# Patient Record
Sex: MALE | Race: WHITE | NOT HISPANIC OR LATINO | Employment: FULL TIME | URBAN - METROPOLITAN AREA
[De-identification: names, ages, dates, MRNs, and addresses within clinical notes are randomized per-mention and may not be internally consistent; named-entity substitution may affect disease eponyms.]

---

## 2023-05-23 ENCOUNTER — OFFICE VISIT (OUTPATIENT)
Dept: FAMILY MEDICINE | Facility: CLINIC | Age: 38
End: 2023-05-23
Payer: COMMERCIAL

## 2023-05-23 VITALS
DIASTOLIC BLOOD PRESSURE: 88 MMHG | OXYGEN SATURATION: 99 % | HEART RATE: 72 BPM | TEMPERATURE: 97.7 F | WEIGHT: 196.6 LBS | HEIGHT: 73 IN | BODY MASS INDEX: 26.06 KG/M2 | SYSTOLIC BLOOD PRESSURE: 130 MMHG | RESPIRATION RATE: 18 BRPM

## 2023-05-23 DIAGNOSIS — J02.9 VIRAL PHARYNGITIS: Primary | ICD-10-CM

## 2023-05-23 DIAGNOSIS — H69.93 DYSFUNCTION OF BOTH EUSTACHIAN TUBES: ICD-10-CM

## 2023-05-23 DIAGNOSIS — J02.9 SORE THROAT: ICD-10-CM

## 2023-05-23 LAB
DEPRECATED S PYO AG THROAT QL EIA: NEGATIVE
GROUP A STREP BY PCR: NOT DETECTED

## 2023-05-23 PROCEDURE — 87651 STREP A DNA AMP PROBE: CPT | Performed by: NURSE PRACTITIONER

## 2023-05-23 PROCEDURE — 87635 SARS-COV-2 COVID-19 AMP PRB: CPT | Performed by: NURSE PRACTITIONER

## 2023-05-23 PROCEDURE — 99203 OFFICE O/P NEW LOW 30 MIN: CPT | Performed by: NURSE PRACTITIONER

## 2023-05-23 RX ORDER — FLUTICASONE PROPIONATE 50 MCG
1 SPRAY, SUSPENSION (ML) NASAL DAILY
Qty: 16 G | Refills: 0 | Status: SHIPPED | OUTPATIENT
Start: 2023-05-23

## 2023-05-23 ASSESSMENT — ENCOUNTER SYMPTOMS
GASTROINTESTINAL NEGATIVE: 1
MUSCULOSKELETAL NEGATIVE: 1
FEVER: 0
COUGH: 0
RHINORRHEA: 1
HEADACHES: 1
RESPIRATORY NEGATIVE: 1
SORE THROAT: 1
SINUS PRESSURE: 1

## 2023-05-23 NOTE — PATIENT INSTRUCTIONS
Your rapid strep test was negative. You have viral pharyngitis.  You may take Ibuprofen and/or Tylenol for pain/fevers.  Other supportive therapy to try: cepacol throat lozenges, salt water gargles, soft and cold foods.  Rest and stay hydrated.  If symptoms worsen or do not improve over the next week, follow-up with your PCP.

## 2023-05-23 NOTE — PROGRESS NOTES
"  Assessment & Plan     Viral pharyngitis  Symptoms consistent with a viral infection. Discussed OTC recommendations for symptom mgmt and when to follow-up. Patient declined viscous lidocaine.    Dysfunction of both eustachian tubes  Discussed OTC recommendations for symptom mgmt and when to follow-up.      - fluticasone (FLONASE) 50 MCG/ACT nasal spray; Spray 1 spray into both nostrils daily    Sore throat  Strep test negative. Will send off for culture. COVID testing done in clinic and sent off.     - Streptococcus A Rapid Screen w/Reflex to PCR - Clinic Collect  - Symptomatic COVID-19 Virus (Coronavirus) by PCR Nose  - Group A Streptococcus PCR Throat Swab    Ordering of each unique test  Prescription drug management         BMI:   Estimated body mass index is 26.06 kg/m  as calculated from the following:    Height as of this encounter: 1.85 m (6' 0.84\").    Weight as of this encounter: 89.2 kg (196 lb 9.6 oz).       Patient Instructions   Your rapid strep test was negative. You have viral pharyngitis.  1. You may take Ibuprofen and/or Tylenol for pain/fevers.  2. Other supportive therapy to try: cepacol throat lozenges, salt water gargles, soft and cold foods.  3. Rest and stay hydrated.  4. If symptoms worsen or do not improve over the next week, follow-up with your PCP.        Elma Henley DNP  [facility]    Jose Manuel Vuong is a 37 year old, presenting for the following health issues:  Throat Problem        5/23/2023     2:34 PM   Additional Questions   Roomed by Lacy Hightower MA   Accompanied by N/A         5/23/2023     2:34 PM   Patient Reported Additional Medications   Patient reports taking the following new medications Vitamin C, OTC [facility] sinus pressure and pain     History of Present Illness       Reason for visit:  Sore throat sinus pressure  Symptom onset:  1-3 days ago  Symptoms include:  Sore throat sinus pressure  Symptom intensity:  Moderate  Symptom " "progression:  Worsening    He eats 2-3 servings of fruits and vegetables daily.He consumes 1 sweetened beverage(s) daily.He exercises with enough effort to increase his heart rate 20 to 29 minutes per day.  He exercises with enough effort to increase his heart rate 3 or less days per week.   He is taking medications regularly.       Acute Illness  Acute illness concerns:   Onset/Duration: yesterday  Symptoms:  Fever: No  Chills/Sweats: No  Headache (location?): YES, slight  Sinus Pressure: YES  Conjunctivitis:  No  Ear Pain: no  Rhinorrhea: YES  Congestion: YES  Sore Throat: YES  Cough: no  Wheeze: No  Decreased Appetite: No  Nausea: No  Vomiting: No  Diarrhea: No  Dysuria/Freq.: No  Dysuria or Hematuria: No  Fatigue/Achiness: No  Sick/Strep Exposure: No  Therapies tried and outcome: OTC [facility] sinus pressure and Vitamin C    Additional provider notes: Patient presents in clinic for 1 day of sinus symptoms that are worsening. No known sick contacts recently. Eyes are red but not itchy or painful.     No Known Allergies    No current outpatient medications on file.     No current facility-administered medications for this visit.       History reviewed. No pertinent past medical history.         Review of Systems   Constitutional: Negative for fever.   HENT: Positive for congestion, rhinorrhea, sinus pressure and sore throat.    Respiratory: Negative.  Negative for cough.    Gastrointestinal: Negative.    Musculoskeletal: Negative.    Neurological: Positive for headaches.            Objective    /88 (BP Location: Right arm, Patient Position: Chair, Cuff Size: Adult Large)   Pulse 72   Temp 97.7  F (36.5  C) (Tympanic)   Resp 18   Ht 1.85 m (6' 0.84\")   Wt 89.2 kg (196 lb 9.6 oz)   SpO2 99%   BMI 26.06 kg/m    Body mass index is 26.06 kg/m .  Physical Exam  Vitals reviewed.   Constitutional:       General: He is not in acute distress.     Appearance: Normal appearance. He is not ill-appearing or " toxic-appearing.   HENT:      Head: Normocephalic and atraumatic.      Right Ear: Ear canal and external ear normal. There is no impacted cerumen. Tympanic membrane is bulging.      Left Ear: Ear canal and external ear normal. There is no impacted cerumen. Tympanic membrane is bulging.      Nose: Congestion present.      Mouth/Throat:      Mouth: Mucous membranes are moist.      Pharynx: Oropharynx is clear. Posterior oropharyngeal erythema (posterior) present.   Eyes:      General: Lids are normal.      Conjunctiva/sclera:      Right eye: Right conjunctiva is injected.      Left eye: Left conjunctiva is injected.   Cardiovascular:      Rate and Rhythm: Normal rate and regular rhythm.      Pulses: Normal pulses.      Heart sounds: Normal heart sounds.   Pulmonary:      Effort: Pulmonary effort is normal.      Breath sounds: Normal breath sounds.   Skin:     General: Skin is warm and dry.   Neurological:      Mental Status: He is alert and oriented to person, place, and time.   Psychiatric:         Behavior: Behavior normal.            Results for orders placed or performed in visit on 05/23/23 (from the past 24 hour(s))   Streptococcus A Rapid Screen w/Reflex to PCR - Clinic Collect    Specimen: Throat; Swab   Result Value Ref Range    Group A Strep antigen Negative Negative

## 2023-05-24 LAB — SARS-COV-2 RNA RESP QL NAA+PROBE: NEGATIVE

## 2023-06-01 ENCOUNTER — HEALTH MAINTENANCE LETTER (OUTPATIENT)
Age: 38
End: 2023-06-01

## 2024-06-02 ENCOUNTER — HEALTH MAINTENANCE LETTER (OUTPATIENT)
Age: 39
End: 2024-06-02

## 2025-02-10 ENCOUNTER — OFFICE VISIT (OUTPATIENT)
Dept: PULMONOLOGY | Facility: CLINIC | Age: 40
End: 2025-02-10
Payer: COMMERCIAL

## 2025-02-10 ENCOUNTER — ANCILLARY PROCEDURE (OUTPATIENT)
Dept: GENERAL RADIOLOGY | Facility: CLINIC | Age: 40
End: 2025-02-10
Payer: COMMERCIAL

## 2025-02-10 ENCOUNTER — PRE VISIT (OUTPATIENT)
Dept: PULMONOLOGY | Facility: CLINIC | Age: 40
End: 2025-02-10

## 2025-02-10 VITALS
WEIGHT: 224 LBS | DIASTOLIC BLOOD PRESSURE: 84 MMHG | BODY MASS INDEX: 30.34 KG/M2 | OXYGEN SATURATION: 96 % | HEIGHT: 72 IN | HEART RATE: 82 BPM | SYSTOLIC BLOOD PRESSURE: 142 MMHG

## 2025-02-10 DIAGNOSIS — R06.2 WHEEZING: ICD-10-CM

## 2025-02-10 DIAGNOSIS — R05.9 COUGH: Primary | ICD-10-CM

## 2025-02-10 DIAGNOSIS — J20.9 ACUTE BRONCHITIS, UNSPECIFIED ORGANISM: Primary | ICD-10-CM

## 2025-02-10 DIAGNOSIS — R07.0 THROAT PAIN: ICD-10-CM

## 2025-02-10 DIAGNOSIS — R05.9 COUGH: ICD-10-CM

## 2025-02-10 DIAGNOSIS — B34.9 VIRAL SYNDROME: ICD-10-CM

## 2025-02-10 LAB
C PNEUM DNA SPEC QL NAA+PROBE: NOT DETECTED
DEPRECATED S PYO AG THROAT QL EIA: NEGATIVE
FLUAV H1 2009 PAND RNA SPEC QL NAA+PROBE: NOT DETECTED
FLUAV H1 RNA SPEC QL NAA+PROBE: NOT DETECTED
FLUAV H3 RNA SPEC QL NAA+PROBE: NOT DETECTED
FLUAV RNA SPEC QL NAA+PROBE: NOT DETECTED
FLUBV RNA SPEC QL NAA+PROBE: NOT DETECTED
HADV DNA SPEC QL NAA+PROBE: NOT DETECTED
HCOV PNL SPEC NAA+PROBE: NOT DETECTED
HMPV RNA SPEC QL NAA+PROBE: NOT DETECTED
HPIV1 RNA SPEC QL NAA+PROBE: NOT DETECTED
HPIV2 RNA SPEC QL NAA+PROBE: NOT DETECTED
HPIV3 RNA SPEC QL NAA+PROBE: NOT DETECTED
HPIV4 RNA SPEC QL NAA+PROBE: NOT DETECTED
M PNEUMO DNA SPEC QL NAA+PROBE: NOT DETECTED
RSV RNA SPEC QL NAA+PROBE: NOT DETECTED
RSV RNA SPEC QL NAA+PROBE: NOT DETECTED
RV+EV RNA SPEC QL NAA+PROBE: DETECTED
S PYO DNA THROAT QL NAA+PROBE: NOT DETECTED

## 2025-02-10 PROCEDURE — 87633 RESP VIRUS 12-25 TARGETS: CPT

## 2025-02-10 PROCEDURE — 87651 STREP A DNA AMP PROBE: CPT

## 2025-02-10 PROCEDURE — 99213 OFFICE O/P EST LOW 20 MIN: CPT

## 2025-02-10 PROCEDURE — 71046 X-RAY EXAM CHEST 2 VIEWS: CPT | Mod: GC | Performed by: RADIOLOGY

## 2025-02-10 PROCEDURE — 99207 PR NO CHARGE LOS: CPT

## 2025-02-10 PROCEDURE — 94726 PLETHYSMOGRAPHY LUNG VOLUMES: CPT | Performed by: INTERNAL MEDICINE

## 2025-02-10 PROCEDURE — 94060 EVALUATION OF WHEEZING: CPT | Performed by: INTERNAL MEDICINE

## 2025-02-10 PROCEDURE — 94729 DIFFUSING CAPACITY: CPT | Performed by: INTERNAL MEDICINE

## 2025-02-10 PROCEDURE — 99204 OFFICE O/P NEW MOD 45 MIN: CPT | Mod: 25

## 2025-02-10 PROCEDURE — 87486 CHLMYD PNEUM DNA AMP PROBE: CPT

## 2025-02-10 RX ORDER — PREDNISONE 20 MG/1
40 TABLET ORAL DAILY
Qty: 10 TABLET | Refills: 0 | Status: SHIPPED | OUTPATIENT
Start: 2025-02-10

## 2025-02-10 RX ORDER — OMEPRAZOLE 40 MG/1
40 CAPSULE, DELAYED RELEASE ORAL 2 TIMES DAILY
COMMUNITY
Start: 2024-11-12

## 2025-02-10 RX ORDER — CETIRIZINE HYDROCHLORIDE 10 MG/1
10 TABLET ORAL DAILY
COMMUNITY

## 2025-02-10 RX ORDER — CITALOPRAM HYDROBROMIDE 40 MG/1
40 TABLET ORAL EVERY MORNING
COMMUNITY

## 2025-02-10 ASSESSMENT — PAIN SCALES - GENERAL: PAINLEVEL_OUTOF10: SEVERE PAIN (9)

## 2025-02-10 NOTE — PROGRESS NOTES
[facility] [address] [address]  Phone: [phone]  Fax: [phone]    Patient:  Yevgeniy Weinstein Jr., Date of birth 1985  Date of Visit:  02/10/2025  Referring Provider Referred Self      Assessment & Plan      Suspected viral URI  History of eosinophilic esophagitis  Mild airflow obstruction  Previous smoker    There are a handful of things going on.  He has noticed a wheeze in his lungs for several weeks now which actually prompted him to stop smoking.  And then about a week and a half ago he had symptoms following inhalation of ice melts, but the symptoms overall sounded more consistent with a viral illness.  On top of that, he has a history of eosinophilic esophagitis and there is airflow obstruction noted on spirometry.  Differential diagnosis for him would include a viral URI and bronchitis, eosinophilic bronchitis or asthma, inhalation injury, smoking-related lung disease.    I will check a viral respiratory panel, strep throat swab.  Given his airflow obstruction, I do wonder about an eosinophilic process in his lungs.  I also suspect that he may have some mild improvement with prednisone and so I gave him a short course of oral steroids.    I suspect he will recover from this illness that is cropped up over the last week and a half.  However I would like to follow up with him given his airflow obstruction noted on spirometry and make sure that that improves as well.  I set up a follow-up with him in 3 to 4 months with spirometry and a CBC with differential looking for eosinophils.    Medical Decision Making             Ayan Rasheed MD              Today's visit note:     Chief Complaint: Cough    HISTORY OF PRESENT ILLNESS:    Yevgeniy Weinstein Jr. is a 39 year old year old male who is being seen for cough, sore throat and shortness of breath.    He has a history of eosinophilic esophagitis, nasal surgery  for deviated septum.    He has a history of smoking marijuana and vaping.  He has noticed wheezing in his lungs for a handful of weeks now.  He actually quit smoking as a result of this a couple weeks ago.  Just about 1 and half weeks ago, he did have an episode where he was using ice melt at his home, and inhaled some of the fine particles of this.  Also right around this time, he had a cascade of worsening symptoms which include nasal congestion, green mucus coming out of his nose and expectorating, body aches, chills, dizziness.  He had to take Sudafed, Afrin and Benadryl to help get him some rest.  Most of the head congestion symptoms have slightly improved over time, however he is still blowing some green mucus out of his nose.  He also has a wheeze and a cough in the upper portion of his chest.    He has a history of sinus infections.  This was in the reasons that he had surgery for the deviated septum.  He continues to have occasional sinus infections after that though.  The abnormal part about this current illness is that he has some upper chest symptoms.  He is concerned about the possibility that inhaling ice melt may be a major contributor.    No sick contacts that he is aware of.         Past Medical and Surgical History:     Past Medical History:   Diagnosis Date    Childhood asthma     Depression     Deviated nasal septum     Environmental allergies     Other seizures (H)     found in 2022.     Past Surgical History:   Procedure Laterality Date    nasal surgery      thumb surgery             Family History:     Family History   Problem Relation Age of Onset    Myocardial Infarction Maternal Grandfather               Social History:     Social History     Socioeconomic History    Marital status: Single     Spouse name: Not on file    Number of children: Not on file    Years of education: Not on file    Highest education level: Not on file   Occupational History    Not on file   Tobacco Use    Smoking  status: Never    Smokeless tobacco: Never   Vaping Use    Vaping status: Some Days   Substance and Sexual Activity    Alcohol use: Not on file    Drug use: Not on file    Sexual activity: Yes   Other Topics Concern    Not on file   Social History Narrative    Not on file     Social Drivers of Health     Financial Resource Strain: Not on file   Food Insecurity: Not on file   Transportation Needs: Not on file   Physical Activity: Not on file   Stress: Not on file   Social Connections: Not on file   Interpersonal Safety: Not on file   Housing Stability: Not on file            Medications:     Current Outpatient Medications   Medication Sig Dispense Refill    cetirizine (ZYRTEC) 10 MG tablet Take 10 mg by mouth daily.      citalopram (CELEXA) 40 MG tablet Take 40 mg by mouth every morning.      fluticasone (FLONASE) 50 MCG/ACT nasal spray Spray 1 spray into both nostrils daily 16 g 0    omeprazole (PRILOSEC) 40 MG DR capsule Take 40 mg by mouth 2 times daily.       No current facility-administered medications for this visit.            Review of Systems:       A complete review of systems was otherwise negative except as noted in the HPI.      PHYSICAL EXAM:  BP (!) 142/84 (BP Location: Left arm, Patient Position: Chair, Cuff Size: Adult Large)   Pulse 82   Ht 1.829 m (6')   Wt 101.6 kg (224 lb)   SpO2 96%   BMI 30.38 kg/m       General: Comfortable, No apparent distress  Eyes: Anicteric  Nose: Redness of the nasal mucosa on the left.  Some mucus and nasal congestion noted on the right nare  Ears: Hearing grossly normal  Mouth: Red posterior oropharynx.  No tonsillar hypertrophy or exudate  Neck: supple, no thyromegaly  Lymphatics: No cervical or supraclavicular nodes  Respiratory: Good air movement. No crackles. No rhonchi. No wheezes  Cardiac: RRR, normal S1, S2. No murmurs.  Musculoskeletal: Extremities normal. No clubbing. No cyanosis. No edema.  Skin: No rash on limited exam  Neuro: Normal mentation. Normal  speech.  Psych:Normal affect           Data:   All laboratory and imaging data reviewed.      PFT:       PFT Interpretation:  Mild airflow obstruction  Normal lung volumes  Supranormal diffusion capacity  Valid Maneuver    CXR: I have reviewed the CXR images from February 10, 2025 and agree with the radiologist interpretation below:  Trachea is midline. Cardiomediastinal silhouette and pulmonary  vasculature are within normal limits. No focal airspace opacity,  pleural effusion or appreciable pneumothorax.     No acute osseous abnormality. Visualized upper abdomen is  unremarkable.                                                                        IMPRESSION:  No acute radiographic cardiopulmonary abnormality.       Recent Results (from the past week)   Pulmonary function test    Collection Time: 02/10/25  1:44 PM   Result Value Ref Range    FVC-Pred 5.23 L    FVC-Pre 5.87 L    FVC-%Pred-Pre 112 %    FEV1-Pre 4.04 L    FEV1-%Pred-Pre 94 %    FEV1FVC-Pred 82 %    FEV1FVC-Pre 69 %    FEFMax-Pred 10.67 L/sec    FEFMax-Pre 9.22 L/sec    FEFMax-%Pred-Pre 86 %    FEF2575-Pred 4.20 L/sec    FEF2575-Pre 2.76 L/sec    MMT7598-%Pred-Pre 65 %    FEF2575-Post 3.33 L/sec    XQT5127-%Pred-Post 79 %    ExpTime-Pre 7.95 sec    FIFMax-Pre 9.61 L/sec    VC-Pred 5.32 L    VC-Pre 5.90 L    VC-%Pred-Pre 110 %    IC-Pred 3.75 L    IC-Pre 4.82 L    IC-%Pred-Pre 128 %    ERV-Pred 1.88 L    ERV-Pre 1.08 L    ERV-%Pred-Pre 57 %    FEV1FEV6-Pred 82 %    FEV1FEV6-Pre 70 %    FRCPleth-Pred 3.68 L    FRCPleth-Pre 3.00 L    FRCPleth-%Pred-Pre 81 %    RVPleth-Pred 1.99 L    RVPleth-Pre 1.92 L    RVPleth-%Pred-Pre 96 %    TLCPleth-Pred 7.53 L    TLCPleth-Pre 7.82 L    TLCPleth-%Pred-Pre 103 %    DLCOunc-Pred 33.47 ml/min/mmHg    DLCOunc-Pre 46.47 ml/min/mmHg    DLCOunc-%Pred-Pre 138 %    VA-Pre 7.95 L    VA-%Pred-Pre 111 %    FEV1SVC-Pred 80 %    FEV1SVC-Pre 68 %

## 2025-02-10 NOTE — CONFIDENTIAL NOTE
RECORDS RECEIVED FROM: internal   per pt, self referred for cough and wheezing, records at [facility]    DATE RECEIVED: 2.10.25   NOTES STATUS DETAILS   OFFICE NOTE from referring provider internal  per pt, self referred for cough and wheezing, records at [facility]    MEDICATION LIST internal     IMAGING  (NEED IMAGES AND REPORTS)     CT SCAN In process     CHEST XRAY (CXR) In process     TESTS     PULMONARY FUNCTION TESTING (PFT) internal  Scheduled 2.10.25        Action 2.10.25 sv    Action Taken Message sent to nurse pool to place CXR/CT order for appt if needed     Called pt, no   trying to locate any cxr and CT imaging done

## 2025-02-10 NOTE — NURSING NOTE
Chief Complaint   Patient presents with    New Patient     New Pulmonary        Vitals were taken, medications reconciled.    Carmelo Gerard, Clinic Assistant   2:47 PM

## 2025-02-10 NOTE — LETTER
2/10/2025      Yevgeniy Weinstein Jr.  [address] [place] [facility] [address]      Dear Colleague,    Thank you for referring your patient, Yevgeniy Weinstein Jr., to the [facility]. Please see a copy of my visit note below.      [facility]  [address] [facility] [address]  Phone: [phone]  Fax: [phone]    Patient:  Yevgeniy Weinstein Jr., Date of birth 1985  Date of Visit:  02/10/2025  Referring Provider Referred Self      Assessment & Plan     Suspected viral URI  History of eosinophilic esophagitis  Mild airflow obstruction  Previous smoker    There are a handful of things going on.  He has noticed a wheeze in his lungs for several weeks now which actually prompted him to stop smoking.  And then about a week and a half ago he had symptoms following inhalation of ice melts, but the symptoms overall sounded more consistent with a viral illness.  On top of that, he has a history of eosinophilic esophagitis and there is airflow obstruction noted on spirometry.  Differential diagnosis for him would include a viral URI and bronchitis, eosinophilic bronchitis or asthma, inhalation injury, smoking-related lung disease.    I will check a viral respiratory panel, strep throat swab.  Given his airflow obstruction, I do wonder about an eosinophilic process in his lungs.  I also suspect that he may have some mild improvement with prednisone and so I gave him a short course of oral steroids.    I suspect he will recover from this illness that is cropped up over the last week and a half.  However I would like to follow up with him given his airflow obstruction noted on spirometry and make sure that that improves as well.  I set up a follow-up with him in 3 to 4 months with spirometry and a CBC with differential looking for eosinophils.    Medical Decision Making            Ayan Rasheed,  MD              Today's visit note:     Chief Complaint: Cough    HISTORY OF PRESENT ILLNESS:    Yevgeniy Weinstein Jr. is a 39 year old year old male who is being seen for cough, sore throat and shortness of breath.    He has a history of eosinophilic esophagitis, nasal surgery for deviated septum.    He has a history of smoking marijuana and vaping.  He has noticed wheezing in his lungs for a handful of weeks now.  He actually quit smoking as a result of this a couple weeks ago.  Just about 1 and half weeks ago, he did have an episode where he was using ice melt at his home, and inhaled some of the fine particles of this.  Also right around this time, he had a cascade of worsening symptoms which include nasal congestion, green mucus coming out of his nose and expectorating, body aches, chills, dizziness.  He had to take Sudafed, Afrin and Benadryl to help get him some rest.  Most of the head congestion symptoms have slightly improved over time, however he is still blowing some green mucus out of his nose.  He also has a wheeze and a cough in the upper portion of his chest.    He has a history of sinus infections.  This was in the reasons that he had surgery for the deviated septum.  He continues to have occasional sinus infections after that though.  The abnormal part about this current illness is that he has some upper chest symptoms.  He is concerned about the possibility that inhaling ice melt may be a major contributor.    No sick contacts that he is aware of.         Past Medical and Surgical History:     Past Medical History:   Diagnosis Date     Childhood asthma      Depression      Deviated nasal septum      Environmental allergies      Other seizures (H)     found in 2022.     Past Surgical History:   Procedure Laterality Date     nasal surgery       thumb surgery             Family History:     Family History   Problem Relation Age of Onset     Myocardial Infarction Maternal Grandfather               Social  History:     Social History     Socioeconomic History     Marital status: Single     Spouse name: Not on file     Number of children: Not on file     Years of education: Not on file     Highest education level: Not on file   Occupational History     Not on file   Tobacco Use     Smoking status: Never     Smokeless tobacco: Never   Vaping Use     Vaping status: Some Days   Substance and Sexual Activity     Alcohol use: Not on file     Drug use: Not on file     Sexual activity: Yes   Other Topics Concern     Not on file   Social History Narrative     Not on file     Social Drivers of Health     Financial Resource Strain: Not on file   Food Insecurity: Not on file   Transportation Needs: Not on file   Physical Activity: Not on file   Stress: Not on file   Social Connections: Not on file   Interpersonal Safety: Not on file   Housing Stability: Not on file            Medications:     Current Outpatient Medications   Medication Sig Dispense Refill     cetirizine (ZYRTEC) 10 MG tablet Take 10 mg by mouth daily.       citalopram (CELEXA) 40 MG tablet Take 40 mg by mouth every morning.       fluticasone (FLONASE) 50 MCG/ACT nasal spray Spray 1 spray into both nostrils daily 16 g 0     omeprazole (PRILOSEC) 40 MG DR capsule Take 40 mg by mouth 2 times daily.       No current facility-administered medications for this visit.            Review of Systems:       A complete review of systems was otherwise negative except as noted in the HPI.      PHYSICAL EXAM:  BP (!) 142/84 (BP Location: Left arm, Patient Position: Chair, Cuff Size: Adult Large)   Pulse 82   Ht 1.829 m (6')   Wt 101.6 kg (224 lb)   SpO2 96%   BMI 30.38 kg/m       General: Comfortable, No apparent distress  Eyes: Anicteric  Nose: Redness of the nasal mucosa on the left.  Some mucus and nasal congestion noted on the right nare  Ears: Hearing grossly normal  Mouth: Red posterior oropharynx.  No tonsillar hypertrophy or exudate  Neck: supple, no  thyromegaly  Lymphatics: No cervical or supraclavicular nodes  Respiratory: Good air movement. No crackles. No rhonchi. No wheezes  Cardiac: RRR, normal S1, S2. No murmurs.  Musculoskeletal: Extremities normal. No clubbing. No cyanosis. No edema.  Skin: No rash on limited exam  Neuro: Normal mentation. Normal speech.  Psych:Normal affect           Data:   All laboratory and imaging data reviewed.      PFT:       PFT Interpretation:  Mild airflow obstruction  Normal lung volumes  Supranormal diffusion capacity  Valid Maneuver    CXR: I have reviewed the CXR images from February 10, 2025 and agree with the radiologist interpretation below:  Trachea is midline. Cardiomediastinal silhouette and pulmonary  vasculature are within normal limits. No focal airspace opacity,  pleural effusion or appreciable pneumothorax.     No acute osseous abnormality. Visualized upper abdomen is  unremarkable.                                                                        IMPRESSION:  No acute radiographic cardiopulmonary abnormality.       Recent Results (from the past week)   Pulmonary function test    Collection Time: 02/10/25  1:44 PM   Result Value Ref Range    FVC-Pred 5.23 L    FVC-Pre 5.87 L    FVC-%Pred-Pre 112 %    FEV1-Pre 4.04 L    FEV1-%Pred-Pre 94 %    FEV1FVC-Pred 82 %    FEV1FVC-Pre 69 %    FEFMax-Pred 10.67 L/sec    FEFMax-Pre 9.22 L/sec    FEFMax-%Pred-Pre 86 %    FEF2575-Pred 4.20 L/sec    FEF2575-Pre 2.76 L/sec    PYD7710-%Pred-Pre 65 %    FEF2575-Post 3.33 L/sec    IUP0148-%Pred-Post 79 %    ExpTime-Pre 7.95 sec    FIFMax-Pre 9.61 L/sec    VC-Pred 5.32 L    VC-Pre 5.90 L    VC-%Pred-Pre 110 %    IC-Pred 3.75 L    IC-Pre 4.82 L    IC-%Pred-Pre 128 %    ERV-Pred 1.88 L    ERV-Pre 1.08 L    ERV-%Pred-Pre 57 %    FEV1FEV6-Pred 82 %    FEV1FEV6-Pre 70 %    FRCPleth-Pred 3.68 L    FRCPleth-Pre 3.00 L    FRCPleth-%Pred-Pre 81 %    RVPleth-Pred 1.99 L    RVPleth-Pre 1.92 L    RVPleth-%Pred-Pre 96 %    TLCPleth-Pred  7.53 L    TLCPleth-Pre 7.82 L    TLCPleth-%Pred-Pre 103 %    DLCOunc-Pred 33.47 ml/min/mmHg    DLCOunc-Pre 46.47 ml/min/mmHg    DLCOunc-%Pred-Pre 138 %    VA-Pre 7.95 L    VA-%Pred-Pre 111 %    FEV1SVC-Pred 80 %    FEV1SVC-Pre 68 %                                           Again, thank you for allowing me to participate in the care of your patient.        Sincerely,        Ayan Rasheed MD    Electronically signed

## 2025-02-10 NOTE — PROGRESS NOTES
Yevgeniy Weinstein Jr. comes into clinic today at the request of Dr Rasheed , for PFT    Tolerated testing well. No adverse reactions. Left lab in no distress.        ANA LUISA SAMSON

## 2025-02-11 LAB
DLCOUNC-%PRED-PRE: 138 %
DLCOUNC-PRE: 46.47 ML/MIN/MMHG
DLCOUNC-PRED: 33.47 ML/MIN/MMHG
ERV-%PRED-PRE: 57 %
ERV-PRE: 1.08 L
ERV-PRED: 1.88 L
EXPTIME-PRE: 7.95 SEC
FEF2575-%PRED-POST: 79 %
FEF2575-%PRED-PRE: 65 %
FEF2575-POST: 3.33 L/SEC
FEF2575-PRE: 2.76 L/SEC
FEF2575-PRED: 4.2 L/SEC
FEFMAX-%PRED-PRE: 86 %
FEFMAX-PRE: 9.22 L/SEC
FEFMAX-PRED: 10.67 L/SEC
FEV1-%PRED-PRE: 94 %
FEV1-PRE: 4.04 L
FEV1FEV6-PRE: 70 %
FEV1FEV6-PRED: 82 %
FEV1FVC-PRE: 69 %
FEV1FVC-PRED: 82 %
FEV1SVC-PRE: 68 %
FEV1SVC-PRED: 80 %
FIFMAX-PRE: 9.61 L/SEC
FRCPLETH-%PRED-PRE: 81 %
FRCPLETH-PRE: 3 L
FRCPLETH-PRED: 3.68 L
FVC-%PRED-PRE: 112 %
FVC-PRE: 5.87 L
FVC-PRED: 5.23 L
IC-%PRED-PRE: 128 %
IC-PRE: 4.82 L
IC-PRED: 3.75 L
RVPLETH-%PRED-PRE: 96 %
RVPLETH-PRE: 1.92 L
RVPLETH-PRED: 1.99 L
TLCPLETH-%PRED-PRE: 103 %
TLCPLETH-PRE: 7.82 L
TLCPLETH-PRED: 7.53 L
VA-%PRED-PRE: 111 %
VA-PRE: 7.95 L
VC-%PRED-PRE: 110 %
VC-PRE: 5.9 L
VC-PRED: 5.32 L

## 2025-06-15 ENCOUNTER — HEALTH MAINTENANCE LETTER (OUTPATIENT)
Age: 40
End: 2025-06-15